# Patient Record
Sex: FEMALE | Race: BLACK OR AFRICAN AMERICAN | Employment: OTHER | URBAN - METROPOLITAN AREA
[De-identification: names, ages, dates, MRNs, and addresses within clinical notes are randomized per-mention and may not be internally consistent; named-entity substitution may affect disease eponyms.]

---

## 2023-03-17 ENCOUNTER — HOSPITAL ENCOUNTER (EMERGENCY)
Age: 73
Discharge: HOME | End: 2023-03-17
Attending: STUDENT IN AN ORGANIZED HEALTH CARE EDUCATION/TRAINING PROGRAM
Payer: MEDICARE

## 2023-03-17 VITALS
OXYGEN SATURATION: 96 % | HEIGHT: 65 IN | TEMPERATURE: 98.4 F | RESPIRATION RATE: 16 BRPM | DIASTOLIC BLOOD PRESSURE: 64 MMHG | WEIGHT: 180 LBS | HEART RATE: 59 BPM | BODY MASS INDEX: 29.99 KG/M2 | SYSTOLIC BLOOD PRESSURE: 129 MMHG

## 2023-03-17 DIAGNOSIS — I10 ASYMPTOMATIC HYPERTENSION: Primary | ICD-10-CM

## 2023-03-17 PROCEDURE — 99283 EMERGENCY DEPT VISIT LOW MDM: CPT

## 2023-03-17 PROCEDURE — 6370000000 HC RX 637 (ALT 250 FOR IP): Performed by: STUDENT IN AN ORGANIZED HEALTH CARE EDUCATION/TRAINING PROGRAM

## 2023-03-17 RX ORDER — ATORVASTATIN CALCIUM 40 MG/1
40 TABLET, FILM COATED ORAL DAILY
COMMUNITY

## 2023-03-17 RX ORDER — AMLODIPINE BESYLATE 5 MG/1
5 TABLET ORAL DAILY
COMMUNITY

## 2023-03-17 RX ORDER — ASPIRIN 81 MG/1
81 TABLET ORAL DAILY
COMMUNITY

## 2023-03-17 RX ORDER — NADOLOL 20 MG/1
20 TABLET ORAL DAILY
COMMUNITY

## 2023-03-17 RX ORDER — CLONIDINE HYDROCHLORIDE 0.1 MG/1
0.1 TABLET ORAL
Status: COMPLETED | OUTPATIENT
Start: 2023-03-17 | End: 2023-03-17

## 2023-03-17 RX ORDER — CHLORTHALIDONE 25 MG/1
25 TABLET ORAL DAILY
COMMUNITY

## 2023-03-17 RX ADMIN — CLONIDINE HYDROCHLORIDE 0.1 MG: 0.1 TABLET ORAL at 22:27

## 2023-03-17 ASSESSMENT — LIFESTYLE VARIABLES
HOW OFTEN DO YOU HAVE A DRINK CONTAINING ALCOHOL: NEVER
HOW MANY STANDARD DRINKS CONTAINING ALCOHOL DO YOU HAVE ON A TYPICAL DAY: PATIENT DOES NOT DRINK

## 2023-03-17 ASSESSMENT — PAIN - FUNCTIONAL ASSESSMENT: PAIN_FUNCTIONAL_ASSESSMENT: NONE - DENIES PAIN

## 2023-03-17 ASSESSMENT — ENCOUNTER SYMPTOMS: SHORTNESS OF BREATH: 0

## 2023-03-18 ENCOUNTER — HOSPITAL ENCOUNTER (EMERGENCY)
Age: 73
Discharge: HOME | End: 2023-03-18
Attending: EMERGENCY MEDICINE
Payer: MEDICARE

## 2023-03-18 VITALS
SYSTOLIC BLOOD PRESSURE: 142 MMHG | RESPIRATION RATE: 16 BRPM | TEMPERATURE: 98.7 F | OXYGEN SATURATION: 99 % | DIASTOLIC BLOOD PRESSURE: 69 MMHG | HEART RATE: 70 BPM

## 2023-03-18 DIAGNOSIS — I10 ASYMPTOMATIC HYPERTENSION: Primary | ICD-10-CM

## 2023-03-18 PROCEDURE — 99282 EMERGENCY DEPT VISIT SF MDM: CPT

## 2023-03-18 ASSESSMENT — ENCOUNTER SYMPTOMS
EYE DISCHARGE: 0
WHEEZING: 0
PHOTOPHOBIA: 0
CHEST TIGHTNESS: 0
EYE PAIN: 0
SORE THROAT: 0
FACIAL SWELLING: 0
ABDOMINAL PAIN: 0
APNEA: 0
COUGH: 0
EYE REDNESS: 0
DIARRHEA: 0
VOMITING: 0
VOICE CHANGE: 0
SHORTNESS OF BREATH: 0
TROUBLE SWALLOWING: 0
RHINORRHEA: 0

## 2023-03-18 NOTE — DISCHARGE INSTRUCTIONS
Your blood pressure level was reassuring today. As we discussed, we do not do anything in the ER for asymptomatic high blood pressure. Blood work was offered to you today which she declined. As we discussed, I recommend you start taking your full doses of blood pressure medications again and follow-up with your primary care doctor. Return here for new or worsening symptoms. As we discussed, I did not find a life threatening cause of your symptoms today. However, THAT DOES NOT MEAN IT COULD NOT DEVELOP. If you develop ANY new or worsening symptoms, it is critical that you return for re-evaluation. This includes any symptoms that are concerning to you, especially symptoms such as dizziness, lightheadedness, headache, vomiting, chest pain, shortness of breath. If you do not return for re-evaluation, you risk serious complications, including death.

## 2023-03-18 NOTE — ED PROVIDER NOTES
Emergency Department Provider Note                   PCP:                Sandra Archibald DO               Age: 72 y.o. Sex: female     DISPOSITION Decision To Discharge 03/18/2023 11:53:08 AM       ICD-10-CM    1. Asymptomatic hypertension  I10           MEDICAL DECISION MAKING  Complexity of Problems Addressed:  Chronic illness    Data Reviewed and Analyzed:  Category 1:   I reviewed records from an external source: provider visit notes from PCP. I ordered each unique test.  I interpreted the results of each unique test.            Category 3: Discussion of management or test interpretation. In summary this is a 72-year-old female patient who presented today with concerns over her blood pressure. Asymptomatic hypertension. No complaints here. No chest pain, shortness of breath, headache, dizziness, lightheadedness, visual changes, vomiting. She recently started cutting her blood pressure medications in half on her own accord and was not told to do this. She has been taking her blood pressure recently and noticed it was higher than usual.  This prompted her to come to the ED. Advised her that she should go back to taking her normal doses as she did not have issues before she started doing this. Today her blood pressure was stable. She only took one of her blood pressure medications. While here she took the other 2. Discussed that with asymptomatic hypertension, no work-up is indicated as she did not have any signs of endorgan damage on her exam.  Discussed we could do blood work and further work-up if she wanted. Using shared medical decision-making process, patient chose to decline blood work and be discharged home. She will follow-up with her family doctor. Counseled on signs return immediately for including but not limited to signs of endorgan damage. Patient verbalized understanding and agreed with the plan. Discharged in stable condition.          Risk of Complications and/or Morbidity of Patient Management:  Prescription drug management performed, Patient was discharged risks and benefits of hospitalization were considered, Shared medical decision making was utilized in creating the patients health plan today. , and Considerations: The following items were considered but not ordered: CBC, CMP, EKG, troponin  Patient requested clonidine. This was declined by the provider. Not indicated. Is this patient to be included in the SEP-1 core measure due to severe sepsis or septic shock? No Exclusion criteria - the patient is NOT to be included for SEP-1 Core Measure due to: Infection is not suspected     Kevin Guillaume is a 72 y.o. female who presents to the Emergency Department with chief complaint of    Chief Complaint   Patient presents with    Hypertension      72-year-old female patient presents today with complaint of elevated blood pressure. She reports recently she started cutting her blood pressure meds in half. She reports her family doctor did not tell her to do this but she was doing it on her own accord. She reports since started doing that, she noticed her blood pressure has been higher than usual.  She also reports she is recently getting over a cold. She states last night she came to the ED because her blood pressure was elevated and was discharged home. She reports she felt fine until this morning when while making breakfast, she felt slightly anxious about her blood pressure. She reports she checked her blood pressure and noticed it was elevated at about 160/80 so she came back to the ED. She states she was given clonidine last night in the ED and would like it again. She also reports she is supposed take 3 blood pressure medications but is only taking 1 so far today. She denies any symptoms at present time. She denies chest pain, shortness of breath, dizziness, lightheadedness, vomiting, syncope, abdominal pain, blood in urine.   Patient is primary historian and quality is reliable. The history is provided by the patient. No  was used. Review of Systems   Constitutional:  Negative for fatigue and fever. HENT:  Negative for congestion, ear pain, facial swelling, hearing loss, rhinorrhea, sore throat, trouble swallowing and voice change. Eyes:  Negative for photophobia, pain, discharge, redness and visual disturbance. Respiratory:  Negative for apnea, cough, chest tightness, shortness of breath and wheezing. Cardiovascular:  Negative for chest pain and palpitations. Gastrointestinal:  Negative for abdominal pain, diarrhea and vomiting. Endocrine: Negative for polydipsia, polyphagia and polyuria. Genitourinary:  Negative for decreased urine volume, dysuria, flank pain, frequency and hematuria. Musculoskeletal:  Negative for arthralgias, joint swelling, myalgias and neck stiffness. Skin:  Negative for rash and wound. Neurological:  Negative for dizziness, syncope, speech difficulty, weakness, light-headedness and headaches. Hematological:  Does not bruise/bleed easily. Psychiatric/Behavioral:  Negative for self-injury and suicidal ideas. All other systems reviewed and are negative. Vitals signs and nursing note reviewed:  Patient Vitals for the past 4 hrs:   Temp Pulse Resp BP SpO2   03/18/23 1152 -- -- -- (!) 141/73 --   03/18/23 1137 98.7 °F (37.1 °C) 70 16 (!) 165/80 98 %          Physical Exam  Vitals and nursing note reviewed. Constitutional:       General: She is not in acute distress. Appearance: Normal appearance. She is obese. She is not ill-appearing, toxic-appearing or diaphoretic. Comments: Well-appearing. Pleasant and cooperative. HENT:      Head: Normocephalic and atraumatic. Right Ear: External ear normal.      Left Ear: External ear normal.      Nose: Nose normal.      Mouth/Throat:      Mouth: Mucous membranes are moist.   Eyes:      General: No scleral icterus.         Right eye: No discharge. Left eye: No discharge. Conjunctiva/sclera: Conjunctivae normal.   Cardiovascular:      Rate and Rhythm: Normal rate and regular rhythm. Pulses: Normal pulses. Heart sounds: Normal heart sounds. Pulmonary:      Effort: Pulmonary effort is normal. No respiratory distress. Breath sounds: Normal breath sounds. No stridor. No wheezing, rhonchi or rales. Abdominal:      Tenderness: There is no abdominal tenderness. There is no guarding or rebound. Musculoskeletal:         General: Normal range of motion. Cervical back: Normal range of motion and neck supple. Skin:     General: Skin is warm and dry. Capillary Refill: Capillary refill takes less than 2 seconds. Coloration: Skin is not jaundiced. Neurological:      General: No focal deficit present. Mental Status: She is alert and oriented to person, place, and time. Mental status is at baseline. Procedures          No orders of the defined types were placed in this encounter. Medications - No data to display    New Prescriptions    No medications on file        Past Medical History:   Diagnosis Date    Diabetes mellitus ([facility] [address]     Hypertension         History reviewed. No pertinent surgical history. History reviewed. No pertinent family history.      Social History     Socioeconomic History    Marital status:      Spouse name: None    Number of children: None    Years of education: None    Highest education level: None        Allergies: Codeine and Keflex [cephalexin]    Previous Medications    AMLODIPINE (NORVASC) 5 MG TABLET    Take 5 mg by mouth daily    ASPIRIN 81 MG EC TABLET    Take 81 mg by mouth daily    ATORVASTATIN (LIPITOR) 40 MG TABLET    Take 40 mg by mouth daily    CHLORTHALIDONE (HYGROTON) 25 MG TABLET    Take 25 mg by mouth daily    METFORMIN (GLUCOPHAGE) 500 MG TABLET    Take 500 mg by mouth 2 times daily (with meals)    NADOLOL (CORGARD) 20 MG TABLET    Take 20 mg by mouth daily        No results found for any visits on 03/18/23.     No orders to display                     Voice dictation software was used during the making of this note.  This software is not perfect and grammatical and other typographical errors may be present.  This note has not been completely proofread for errors.     PARISH Macedo  03/18/23 2368

## 2023-03-18 NOTE — DISCHARGE INSTRUCTIONS
Recommend returning here previous prescribed dose of amlodipine/valsartan. Please help with your primary care doctor to discuss your medications.   Return to the ER if any new or worsening symptoms you develop chest pain, shortness of breath, weakness, vision changes

## 2023-03-18 NOTE — ED TRIAGE NOTES
Pt c/o HTN and some \"jitteriness\" in her hands and feet. Denies headaches, lightheadedness, dizziness. Pt states she was seen here last night for the same thing.

## 2023-03-18 NOTE — ED TRIAGE NOTES
She has been fighting a cold this week. This morning her blood pressure started to go up. At home it was 164/90 but is has kept going up. She does take medication and she took it this morning but did not help.

## 2023-03-18 NOTE — ED PROVIDER NOTES
[address]  Emergency Department    ED Course     ED Course as of 03/18/23 1810   Elliott Maxwell Mar 17, 2023   253 82-year-old female physical history DM, HTN presents with asymptomatic hypertension. /91, otherwise vitals within normal limits. Neuro intact; NIH 0. No history or physical exam evidence of endorgan damage. Through shared decision-making with patient, offered blood work and ultimately elected to start with p.o. antihypertensive and reassess. She does not currently have any symptoms at this time and appears stable. We will recheck blood pressure after clonidine [ER]   2305 Blood pressure improved after clonidine. She remains without symptoms. She feels comfortable discharge home. We will have her return to her previous doses of prescribed antihypertensives and follow-up with her primary care doctor to discuss her medications. Return precautions given. [ER]      ED Course User Index  [ER] Zack Gavin MD     Complexity of Problems Addressed:  1 or more acute stable illness    Data Reviewed and Analyzed:  Category 1:   I ordered each unique test.  I reviewed the results of each unique test.      Category 2:     Category 3: Discussion of management or test interpretation. See ED Course above    HPI   Rubén Quiñones is a 72 y.o. female with a history of DM, HTN who presents to the ED with complaint of high blood pressure. Patient states over the last 5 days she has had an upper respiratory infection including cough, rhinorrhea, sore throat. Has been taking multiple over-the-counter medications to help with sinus congestion. Over the past 1 to 2 weeks she has also decreased her amlodipine/valsartan to half a tablet daily. She has not directed to do so by her PCP. States she is concerned that her blood pressure had been low so she decided to decrease her medication. Today she notes that her blood pressure was up in systolic 082U to 142E.   She denies any symptoms such as chest pain, vision changes, weakness, numbness, facial droop, slurred speech, difficulty urinating, shortness of breath, abdominal pain. ROS   Review of Systems   Respiratory:  Negative for shortness of breath. Cardiovascular:  Negative for chest pain. All other systems reviewed and are negative. History     Past Medical History:   Diagnosis Date    Diabetes mellitus ([facility] [address]     Hypertension      No past surgical history on file. No family history on file. Allergies   Allergen Reactions    Codeine      It makes her \"whoozy\"    Keflex [Cephalexin] Itching       Physical Exam     Vitals:    03/17/23 2153 03/17/23 2227 03/17/23 2240 03/17/23 2315   BP: (!) 185/91 (!) 154/69 (!) 147/60 129/64   Pulse: 67  59    Resp: 16      Temp: 98.4 °F (36.9 °C)      TempSrc: Oral      SpO2: 99% 99% 97% 96%   Weight: 180 lb (81.6 kg)      Height: 5' 4.5\" (1.638 m)        Nursing note and vitals reviewed. Constitutional: Well developed, NAD  HEENT: Atraumatic, conjugate gaze, EOM intact  Neck: Supple  Cardiovascular: No cyanosis, diaphoresis, or JVD appreciated. Normal S1/S2 with no murmurs noted. Respiratory: Effort normal. No respiratory distress. Lungs CTAB. Gastrointestinal: Bowel sounds present. Non-distended. No guarding or rebound. Non-tender. MSK: No deformities appreciated. No peripheral edema. Skin: Skin is warm and dry. No rash appreciated. Neuro: Alert and oriented, moves all four extremities. 5/5 strength. Sensation intact throughout. Cranial nerves II-XII intact. Psych: Pleasant and cooperative. Procedures   Procedures    MDM   Labs Reviewed - No data to display  Medications   cloNIDine (CATAPRES) tablet 0.1 mg (0.1 mg Oral Given 3/17/23 2227)     No orders to display     Voice dictation software was used during the making of this note. This software is not perfect and grammatical and other typographical errors may be present. This note has not been completely proofread for errors. Wanda Terrell MD  03/18/23 0738

## 2023-03-18 NOTE — ED NOTES
I have reviewed discharge instructions with the patient. The patient verbalized understanding. Patient left ED via Discharge Method: ambulatory to Home with spouse. Opportunity for questions and clarification provided. Patient given 0 scripts. To continue your aftercare when you leave the hospital, you may receive an automated call from our care team to check in on how you are doing. This is a free service and part of our promise to provide the best care and service to meet your aftercare needs.  If you have questions, or wish to unsubscribe from this service please call [phone]. Thank you for Choosing our [facility] Emergency Department.         Angie Sanford RN  03/18/23 0964

## 2023-03-18 NOTE — ED NOTES
I have reviewed discharge instructions with the patient. The patient verbalized understanding. Patient left ED via Discharge Method: ambulatory to Home with self. Opportunity for questions and clarification provided. Patient given 0 scripts. To continue your aftercare when you leave the hospital, you may receive an automated call from our care team to check in on how you are doing. This is a free service and part of our promise to provide the best care and service to meet your aftercare needs.  If you have questions, or wish to unsubscribe from this service please call [phone]. Thank you for Choosing our [facility] Emergency Department.         Johan Chapa RN  03/17/23 4217

## 2023-12-12 ENCOUNTER — HOSPITAL ENCOUNTER (EMERGENCY)
Age: 73
Discharge: HOME | End: 2023-12-12
Attending: GENERAL PRACTICE
Payer: MEDICARE

## 2023-12-12 VITALS
SYSTOLIC BLOOD PRESSURE: 128 MMHG | OXYGEN SATURATION: 98 % | HEART RATE: 71 BPM | TEMPERATURE: 97.5 F | RESPIRATION RATE: 18 BRPM | DIASTOLIC BLOOD PRESSURE: 69 MMHG

## 2023-12-12 DIAGNOSIS — I10 HYPERTENSION, UNSPECIFIED TYPE: Primary | ICD-10-CM

## 2023-12-12 PROCEDURE — 99282 EMERGENCY DEPT VISIT SF MDM: CPT

## 2023-12-13 NOTE — ED TRIAGE NOTES
Pt c/o hypertension, pt sates she has been under a lot of stress lately.  Pt states her pcp told her to take an additional amlodipine but didn't help much with BP.

## 2023-12-13 NOTE — ED PROVIDER NOTES
(CORGARD) 20 MG TABLET    Take 20 mg by mouth daily        No results found for any visits on 12/12/23. No orders to display                     Voice dictation software was used during the making of this note. This software is not perfect and grammatical and other typographical errors may be present. This note has not been completely proofread for errors.      Joon Whatley DO  12/12/23 4098